# Patient Record
Sex: FEMALE | Race: BLACK OR AFRICAN AMERICAN
[De-identification: names, ages, dates, MRNs, and addresses within clinical notes are randomized per-mention and may not be internally consistent; named-entity substitution may affect disease eponyms.]

---

## 2022-08-25 ENCOUNTER — HOSPITAL ENCOUNTER (EMERGENCY)
Dept: HOSPITAL 97 - ER | Age: 37
Discharge: HOME | End: 2022-08-25
Payer: COMMERCIAL

## 2022-08-25 VITALS — TEMPERATURE: 98 F | OXYGEN SATURATION: 100 % | SYSTOLIC BLOOD PRESSURE: 128 MMHG | DIASTOLIC BLOOD PRESSURE: 78 MMHG

## 2022-08-25 DIAGNOSIS — L03.011: Primary | ICD-10-CM

## 2022-08-25 PROCEDURE — 99283 EMERGENCY DEPT VISIT LOW MDM: CPT

## 2022-08-25 PROCEDURE — 90714 TD VACC NO PRESV 7 YRS+ IM: CPT

## 2022-08-25 PROCEDURE — 0H9FXZZ DRAINAGE OF RIGHT HAND SKIN, EXTERNAL APPROACH: ICD-10-PCS

## 2022-08-25 PROCEDURE — 90471 IMMUNIZATION ADMIN: CPT

## 2022-08-25 NOTE — EDPHYS
Physician Documentation                                                                           

 [facility]                                                                 

Name: Nella Gill                                                                                

Age: 37 yrs                                                                                       

Sex: Female                                                                                       

: 1985                                                                                   

MRN: [id number]                                                                                   

Arrival Date: 2022                                                                          

Time: 17:32                                                                                       

Account#: [id number]                                                                            

Bed 19                                                                                            

Private MD:                                                                                       

ED Physician Jorge Quezada                                                                         

HPI:                                                                                              

                                                                                             

18:37 This 37 yrs old Black Female presents to ER via Unassigned with complaints of Finger    ms3 

      Injury.                                                                                     

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 The rash is located on the dorsal aspect of distal          

      phalanx of right index finger. The rash can be described as abscess. Onset: The             

      symptoms/episode began/occurred 1 week(s) ago. Associated signs and symptoms: Pertinent     

      positives: Pain. Severity of symptoms: At their worst the symptoms were moderate in the     

      emergency department the symptoms are unchanged. Treatment given at home: NSAIDS.           

                                                                                                  

OB/GYN:                                                                                           

17:30 LMP N/A - Post-menopause                                                                kb3 

                                                                                                  

Historical:                                                                                       

- Allergies:                                                                                      

18:41 No Known Allergies;                                                                     kb3 

- Home Meds:                                                                                      

18:41 None [Active];                                                                          kb3 

- PMHx:                                                                                           

18:41 None;                                                                                   kb3 

- PSHx:                                                                                           

18:41 None;                                                                                   kb3 

                                                                                                  

- Immunization history:: Adult Immunizations up to date, Client reports receiving the             

  2nd dose of the Covid vaccine, Last tetanus immunization: unknown.                              

- Social history:: Smoking status: Patient denies any tobacco usage or history of.                

                                                                                                  

                                                                                                  

ROS:                                                                                              

18:37 Constitutional: Negative for fever, and chills. Neck: Negative for injury, pain, and    ms3 

      swelling, Cardiovascular: Negative for chest pain, and palpitations. Respiratory:           

      Negative for shortness of breath, cough, wheezing, and pleuritic chest pain,                

      Abdomen/GI: Negative for abdominal pain, nausea, vomiting, diarrhea, and constipation,      

      MS/Extremity: Negative for injury and deformity.                                            

18:37 Skin: Positive for rash.                                                                    

18:37 All other systems are negative.                                                             

                                                                                                  

Exam:                                                                                             

18:37 Constitutional:  This is a well developed, well nourished patient who is awake, alert,  ms3 

      and in no acute distress. Head/Face:  Normocephalic, atraumatic. Neck:  Trachea             

      midline, no cervical lymphadenopathy.  Supple, full range of motion without nuchal          

      rigidity, or vertebral point tenderness.  No Meningismus. Chest/axilla:  Normal chest       

      wall appearance and motion.  Nontender with no deformity.   Cardiovascular:  Regular        

      rate and rhythm with a normal S1 and S2.  No gallops, murmurs, or rubs.  Normal PMI, no     

      JVD.  No pulse deficits. Respiratory:  Lungs have equal breath sounds bilaterally,          

      clear to auscultation and percussion.  No rales, rhonchi or wheezes noted.  No              

      increased work of breathing, no retractions or nasal flaring. Abdomen/GI:  Soft,            

      non-tender, with normal bowel sounds.  No distension or tympany.  No guarding or            

      rebound.  No evidence of tenderness throughout. Psych:  Awake, alert, with orientation      

      to person, place and time.  Behavior, mood, and affect are within normal limits.            

18:37 Skin: Appearance: abscess, that is moderate sized, of the dorsal aspect of distal           

      phalanx of right index finger, with fluctuance.                                             

                                                                                                  

Vital Signs:                                                                                      

17:30  / 78; Pulse 82; Resp 16; Temp 98; Pulse Ox 100% ; Weight 68.04 kg; Height 5 ft.  kb3 

      6 in. (167.64 cm); Pain 7/10;                                                               

17:30 Body Mass Index 24.21 (68.04 kg, 167.64 cm)                                             kb3 

                                                                                                  

Procedures:                                                                                       

18:33 I \T\ D: Incision and drainage was performed for an abscess of the right Index finger     ms3

      Prepped with alcohol, Anesthetized with 5 ml's 1% Lidocaine. Digital block. Incised         

      with #11 blade. Drained moderate amount purulent fluid. Dressing: sterile 4x4 gauze,        

      petroleum dressing, the patient tolerated the procedure poorly, Paronychia not packed       

      as patient stated she would like the procedure stopped. Explained risks of not packing      

      the paronychia with patient. She accepts risks of recollection of abscess..                 

                                                                                                  

MDM:                                                                                              

17:47 Patient medically screened.                                                             ms3 

18:37 Data reviewed: vital signs, nurses notes, and as a result, I will discharge patient.    ms3 

      Counseling: I had a detailed discussion with the patient and/or guardian regarding: the     

      historical points, exam findings, and any diagnostic results supporting the                 

      discharge/admit diagnosis, the need for outpatient follow up, to return to the              

      emergency department if symptoms worsen or persist or if there are any questions or         

      concerns that arise at home. Special discussion: I discussed with the patient/guardian      

      in detail that at this point there is no indication for admission to the hospital. It       

      is understood, however, that if the symptoms persist or worsen the patient needs to         

      return immediately for re-evaluation.                                                       

                                                                                                  

Administered Medications:                                                                         

18:17 Drug: Lidocaine (1 %) 10 ml {Note: At bedside for MD use.} Volume: 20 ml; Route:        kb3 

      Infiltration;                                                                               

18:18 Follow up: Response: No adverse reaction                                                kb3 

18:28 Drug: ADAcel 0.5 ml {: [employer] ([employer]). Exp: 10/08/2022. Lot #:       [id number] 

      [id number]. } Route: IM; Site: left deltoid;                                                     

18:31 Follow up: Response: No adverse reaction                                                kb3 

                                                                                                  

                                                                                                  

Disposition Summary:                                                                              

22 18:08                                                                                    

Discharge Ordered                                                                                 

      Location: Home                                                                          ms3 

      Condition: Stable                                                                       ms3 

      Diagnosis                                                                                   

        - Paronychia                                                                          ms3 

      Followup:                                                                               ms3 

        - With: Isaac Cruz MD                                                                

        - When: 2 - 3 days                                                                         

        - Reason: Recheck today's complaints                                                       

      Discharge Instructions:                                                                     

        - Discharge Summary Sheet                                                             ms3 

        - Paronychia, Easy-to-Read                                                            ms3 

      Forms:                                                                                      

        - Medication Reconciliation Form                                                      ms3 

        - Thank You Letter                                                                    ms3 

        - Antibiotic Education                                                                ms3 

        - Prescription Opioid Use                                                             ms3 

      Prescriptions:                                                                              

        - Doxycycline Hyclate 100 mg Oral Tablet                                                   

            - take 1 tablet by ORAL route every 12 hours; 20 tablet; Refills: 0, Product      ms3 

      Selection Permitted                                                                         

        - Ibuprofen 600 mg Oral Tablet                                                             

            - take 1 tablet by ORAL route every 6 hours As needed take with food; 30 tablet;  ms3 

      Refills: 0, Product Selection Permitted                                                     

Signatures:                                                                                       

Jorge Quezada DO                        DO   ms3                                                  

Neha Morales, RN                    RN   kb3                                                  

                                                                                                  

**************************************************************************************************

## 2022-08-25 NOTE — ER
Nurse's Notes                                                                                     

 [facility]                                                                 

Name: Nella Gill                                                                                

Age: 37 yrs                                                                                       

Sex: Female                                                                                       

: 1985                                                                                   

MRN: [id number]                                                                                   

Arrival Date: 2022                                                                          

Time: 17:32                                                                                       

Account#: [id number]                                                                            

Bed 19                                                                                            

Private MD:                                                                                       

Diagnosis: Paronychia                                                                             

                                                                                                  

Presentation:                                                                                     

                                                                                             

17:30 Chief complaint: Patient states: Pt report infection to right index finger at base of   kb3 

      nailbed x2 weeks.                                                                           

17:30 Coronavirus screen: Vaccine status: Patient reports receiving the 2nd dose of the covid kb3 

      vaccine. Client denies travel out of the [place] in the last 14 days. At this time, the        

      client does not indicate any symptoms associated with coronavirus-19. Ebola Screen:         

      Patient negative for fever greater than or equal to 101.5 degrees Fahrenheit, and           

      additional compatible Ebola Virus Disease symptoms Patient denies exposure to               

      infectious person. Patient denies travel to an Ebola-affected area in the 21 days           

      before illness onset. No symptoms or risks identified at this time. Initial Sepsis          

      Screen: Does the patient meet any 2 criteria? No. Patient's initial sepsis screen is        

      negative. Does the patient have a suspected source of infection? Yes: Skin                  

      breakdown/wound. Risk Assessment: Do you want to hurt yourself or someone else? Patient     

      reports no desire to harm self or others. Onset of symptoms was 2022.            

17:30 Method Of Arrival: Ambulatory                                                           kb3 

17:30 Acuity: DEXTER 4                                                                           kb3 

                                                                                                  

Triage Assessment:                                                                                

17:30 General: Appears in no apparent distress. Behavior is calm, cooperative.                kb3 

17:30 Pain: Complains of pain in dorsal aspect of distal phalanx of right index finger Pain   kb3 

      does not radiate. Pain currently is 7 out of 10 on a pain scale. Quality of pain is         

      described as burning, pressure. Injury Description: Paronychia at the base of the           

      nailbed on the right index finger.                                                          

                                                                                                  

OB/GYN:                                                                                           

17:30 LMP N/A - Post-menopause                                                                kb3 

                                                                                                  

Historical:                                                                                       

- Allergies:                                                                                      

18:41 No Known Allergies;                                                                     kb3 

- Home Meds:                                                                                      

18:41 None [Active];                                                                          kb3 

- PMHx:                                                                                           

18:41 None;                                                                                   kb3 

- PSHx:                                                                                           

18:41 None;                                                                                   kb3 

                                                                                                  

- Immunization history:: Adult Immunizations up to date, Client reports receiving the             

  2nd dose of the Covid vaccine, Last tetanus immunization: unknown.                              

- Social history:: Smoking status: Patient denies any tobacco usage or history of.                

                                                                                                  

                                                                                                  

Screenin:30 Abuse screen: Denies threats or abuse. Denies injuries from another. Nutritional        kb3 

      screening: No deficits noted. Tuberculosis screening: No symptoms or risk factors           

      identified. Fall Risk None identified.                                                      

                                                                                                  

Assessment:                                                                                       

17:30 General: See triage note.                                                               kb3 

17:30 Pain: Complains of pain in dorsal aspect of distal phalanx of right index finger.       kb3 

      Musculoskeletal: Reports pain in dorsal aspect of distal phalanx of right index finger.     

                                                                                                  

Vital Signs:                                                                                      

17:30  / 78; Pulse 82; Resp 16; Temp 98; Pulse Ox 100% ; Weight 68.04 kg; Height 5 ft.  kb3 

      6 in. (167.64 cm); Pain 7/10;                                                               

17:30 Body Mass Index 24.21 (68.04 kg, 167.64 cm)                                             kb3 

                                                                                                  

ED Course:                                                                                        

17:30 Arm band placed on Patient placed in an exam room.                                      kb3 

17:30 Patient has correct armband on for positive identification.                             kb3 

17:30 Assist provider with I \T\ D: Set up I\T\D tray. Performed by Jorge Quezada DO. Patient did   kb
3

      not have IV access during this emergency room visit.                                        

17:32 Patient arrived in ED.                                                                  as  

17:34 Jorge Quezada DO is Attending Physician.                                                ms3 

17:47 Neha Morales, RN is Primary Nurse.                                                  kb3 

18:08 Isaac Cruz MD is Referral Physician.                                              ms3 

18:41 Triage completed.                                                                       kb3 

                                                                                                  

Administered Medications:                                                                         

18:17 Drug: Lidocaine (1 %) 10 ml {Note: At bedside for MD use.} Volume: 20 ml; Route:        kb3 

      Infiltration;                                                                               

18:18 Follow up: Response: No adverse reaction                                                kb3 

18:28 Drug: ADAcel 0.5 ml {: [employer] ([employer]). Exp: 10/08/2022. Lot #:       [id number] 

      [id number]. } Route: IM; Site: left deltoid;                                                     

18:31 Follow up: Response: No adverse reaction                                                kb3 

                                                                                                  

                                                                                                  

Medication:                                                                                       

17:30 VIS not applicable for this client.                                                     kb3 

                                                                                                  

Outcome:                                                                                          

18:08 Discharge ordered by MD.                                                                ms3 

18:45 Discharged to home ambulatory.                                                          kb3 

18:45 Condition: stable                                                                           

18:45 Discharge instructions given to patient, Instructed on discharge instructions, follow       

      up and referral plans. medication usage, Demonstrated understanding of instructions,        

      follow-up care, medications, Prescriptions given X 2.                                       

18:45 Patient left the ED.                                                                    kb3 

                                                                                                  

Signatures:                                                                                       

Lucie Palm Marcus, DO                        DO   ms3                                                  

Neha Morales, RN                    RN   kb3                                                  

                                                                                                  

**************************************************************************************************